# Patient Record
Sex: MALE | Race: OTHER | Employment: FULL TIME | URBAN - METROPOLITAN AREA
[De-identification: names, ages, dates, MRNs, and addresses within clinical notes are randomized per-mention and may not be internally consistent; named-entity substitution may affect disease eponyms.]

---

## 2018-07-03 ENCOUNTER — LAB ENCOUNTER (OUTPATIENT)
Dept: LAB | Facility: HOSPITAL | Age: 43
End: 2018-07-03
Attending: INTERNAL MEDICINE
Payer: COMMERCIAL

## 2018-07-03 DIAGNOSIS — Z86.39 PERSONAL HISTORY OF NUTRITIONAL DEFICIENCY: Primary | ICD-10-CM

## 2018-07-03 LAB
ALBUMIN SERPL BCP-MCNC: 4.1 G/DL (ref 3.5–4.8)
ALBUMIN/GLOB SERPL: 1.3 {RATIO} (ref 1–2)
ALP SERPL-CCNC: 43 U/L (ref 32–100)
ALT SERPL-CCNC: 30 U/L (ref 17–63)
ANION GAP SERPL CALC-SCNC: 7 MMOL/L (ref 0–18)
AST SERPL-CCNC: 29 U/L (ref 15–41)
BASOPHILS # BLD: 0.1 K/UL (ref 0–0.2)
BASOPHILS NFR BLD: 1 %
BILIRUB SERPL-MCNC: 0.7 MG/DL (ref 0.3–1.2)
BUN SERPL-MCNC: 11 MG/DL (ref 8–20)
BUN/CREAT SERPL: 9.7 (ref 10–20)
CALCIUM SERPL-MCNC: 9.4 MG/DL (ref 8.5–10.5)
CHLORIDE SERPL-SCNC: 107 MMOL/L (ref 95–110)
CHOLEST SERPL-MCNC: 194 MG/DL (ref 110–200)
CO2 SERPL-SCNC: 27 MMOL/L (ref 22–32)
CREAT SERPL-MCNC: 1.13 MG/DL (ref 0.5–1.5)
EOSINOPHIL # BLD: 0.3 K/UL (ref 0–0.7)
EOSINOPHIL NFR BLD: 4 %
ERYTHROCYTE [DISTWIDTH] IN BLOOD BY AUTOMATED COUNT: 12.3 % (ref 11–15)
GLOBULIN PLAS-MCNC: 3.2 G/DL (ref 2.5–3.7)
GLUCOSE SERPL-MCNC: 102 MG/DL (ref 70–99)
HBA1C MFR BLD: 5.4 % (ref 4–6)
HCT VFR BLD AUTO: 42.9 % (ref 41–52)
HDLC SERPL-MCNC: 42 MG/DL
HGB BLD-MCNC: 14.5 G/DL (ref 13.5–17.5)
LDLC SERPL CALC-MCNC: 137 MG/DL (ref 0–99)
LYMPHOCYTES # BLD: 1.9 K/UL (ref 1–4)
LYMPHOCYTES NFR BLD: 27 %
MCH RBC QN AUTO: 30.5 PG (ref 27–32)
MCHC RBC AUTO-ENTMCNC: 33.9 G/DL (ref 32–37)
MCV RBC AUTO: 90.1 FL (ref 80–100)
MONOCYTES # BLD: 0.5 K/UL (ref 0–1)
MONOCYTES NFR BLD: 7 %
NEUTROPHILS # BLD AUTO: 4.2 K/UL (ref 1.8–7.7)
NEUTROPHILS NFR BLD: 61 %
NONHDLC SERPL-MCNC: 152 MG/DL
OSMOLALITY UR CALC.SUM OF ELEC: 292 MOSM/KG (ref 275–295)
PATIENT FASTING: YES
PLATELET # BLD AUTO: 231 K/UL (ref 140–400)
PMV BLD AUTO: 9.6 FL (ref 7.4–10.3)
POTASSIUM SERPL-SCNC: 4.3 MMOL/L (ref 3.3–5.1)
PROT SERPL-MCNC: 7.3 G/DL (ref 5.9–8.4)
RBC # BLD AUTO: 4.76 M/UL (ref 4.5–5.9)
SODIUM SERPL-SCNC: 141 MMOL/L (ref 136–144)
TRIGL SERPL-MCNC: 75 MG/DL (ref 1–149)
TSH SERPL-ACNC: 2.59 UIU/ML (ref 0.45–5.33)
WBC # BLD AUTO: 6.9 K/UL (ref 4–11)

## 2018-07-03 PROCEDURE — 84443 ASSAY THYROID STIM HORMONE: CPT

## 2018-07-03 PROCEDURE — 36415 COLL VENOUS BLD VENIPUNCTURE: CPT

## 2018-07-03 PROCEDURE — 80061 LIPID PANEL: CPT

## 2018-07-03 PROCEDURE — 85025 COMPLETE CBC W/AUTO DIFF WBC: CPT

## 2018-07-03 PROCEDURE — 80053 COMPREHEN METABOLIC PANEL: CPT

## 2018-07-03 PROCEDURE — 83036 HEMOGLOBIN GLYCOSYLATED A1C: CPT

## 2019-03-30 ENCOUNTER — HOSPITAL ENCOUNTER (OUTPATIENT)
Facility: HOSPITAL | Age: 44
Setting detail: OBSERVATION
Discharge: HOME | End: 2019-04-01
Attending: EMERGENCY MEDICINE | Admitting: INTERNAL MEDICINE
Payer: COMMERCIAL

## 2019-03-30 DIAGNOSIS — M54.59 INTRACTABLE LOW BACK PAIN: Primary | ICD-10-CM

## 2019-03-30 RX ORDER — SODIUM CHLORIDE 0.9 % (FLUSH) 0.9 %
3 SYRINGE (ML) INJECTION AS NEEDED
Status: DISCONTINUED | OUTPATIENT
Start: 2019-03-30 | End: 2019-04-01

## 2019-03-30 RX ORDER — HYDROCODONE BITARTRATE AND ACETAMINOPHEN 5; 325 MG/1; MG/1
1 TABLET ORAL EVERY 4 HOURS PRN
Status: DISCONTINUED | OUTPATIENT
Start: 2019-03-30 | End: 2019-04-01

## 2019-03-30 RX ORDER — DOCUSATE SODIUM 100 MG/1
100 CAPSULE, LIQUID FILLED ORAL 2 TIMES DAILY
Status: DISCONTINUED | OUTPATIENT
Start: 2019-03-30 | End: 2019-04-01

## 2019-03-30 RX ORDER — CYCLOBENZAPRINE HCL 5 MG
5 TABLET ORAL 3 TIMES DAILY
COMMUNITY
End: 2020-02-21 | Stop reason: ALTCHOICE

## 2019-03-30 RX ORDER — KETOROLAC TROMETHAMINE 30 MG/ML
30 INJECTION, SOLUTION INTRAMUSCULAR; INTRAVENOUS ONCE
Status: COMPLETED | OUTPATIENT
Start: 2019-03-30 | End: 2019-03-30

## 2019-03-30 RX ORDER — BACLOFEN 10 MG/1
10 TABLET ORAL 4 TIMES DAILY
Status: DISCONTINUED | OUTPATIENT
Start: 2019-03-30 | End: 2019-04-01

## 2019-03-30 RX ORDER — METOCLOPRAMIDE HYDROCHLORIDE 5 MG/ML
10 INJECTION INTRAMUSCULAR; INTRAVENOUS EVERY 8 HOURS PRN
Status: DISCONTINUED | OUTPATIENT
Start: 2019-03-30 | End: 2019-04-01

## 2019-03-30 RX ORDER — POLYETHYLENE GLYCOL 3350 17 G/17G
17 POWDER, FOR SOLUTION ORAL DAILY PRN
Status: DISCONTINUED | OUTPATIENT
Start: 2019-03-30 | End: 2019-03-31

## 2019-03-30 RX ORDER — HYDROCODONE BITARTRATE AND ACETAMINOPHEN 5; 325 MG/1; MG/1
2 TABLET ORAL EVERY 4 HOURS PRN
Status: DISCONTINUED | OUTPATIENT
Start: 2019-03-30 | End: 2019-04-01

## 2019-03-30 RX ORDER — SODIUM PHOSPHATE, DIBASIC AND SODIUM PHOSPHATE, MONOBASIC 7; 19 G/133ML; G/133ML
1 ENEMA RECTAL ONCE AS NEEDED
Status: DISCONTINUED | OUTPATIENT
Start: 2019-03-30 | End: 2019-03-31

## 2019-03-30 RX ORDER — ACETAMINOPHEN 325 MG/1
650 TABLET ORAL EVERY 4 HOURS PRN
Status: DISCONTINUED | OUTPATIENT
Start: 2019-03-30 | End: 2019-04-01

## 2019-03-30 RX ORDER — AMOXICILLIN 500 MG
CAPSULE ORAL
COMMUNITY

## 2019-03-30 RX ORDER — ORPHENADRINE CITRATE 30 MG/ML
60 INJECTION INTRAMUSCULAR; INTRAVENOUS ONCE
Status: COMPLETED | OUTPATIENT
Start: 2019-03-30 | End: 2019-03-30

## 2019-03-30 RX ORDER — MORPHINE SULFATE 4 MG/ML
4 INJECTION, SOLUTION INTRAMUSCULAR; INTRAVENOUS ONCE
Status: DISCONTINUED | OUTPATIENT
Start: 2019-03-30 | End: 2019-03-30

## 2019-03-30 RX ORDER — BISACODYL 10 MG
10 SUPPOSITORY, RECTAL RECTAL
Status: DISCONTINUED | OUTPATIENT
Start: 2019-03-30 | End: 2019-04-01

## 2019-03-30 RX ORDER — ONDANSETRON 2 MG/ML
4 INJECTION INTRAMUSCULAR; INTRAVENOUS EVERY 6 HOURS PRN
Status: DISCONTINUED | OUTPATIENT
Start: 2019-03-30 | End: 2019-04-01

## 2019-03-30 RX ORDER — MORPHINE SULFATE 4 MG/ML
8 INJECTION, SOLUTION INTRAMUSCULAR; INTRAVENOUS ONCE
Status: COMPLETED | OUTPATIENT
Start: 2019-03-30 | End: 2019-03-30

## 2019-03-30 RX ORDER — ONDANSETRON 2 MG/ML
4 INJECTION INTRAMUSCULAR; INTRAVENOUS EVERY 4 HOURS PRN
Status: DISCONTINUED | OUTPATIENT
Start: 2019-03-30 | End: 2019-03-31 | Stop reason: ALTCHOICE

## 2019-03-30 NOTE — ED INITIAL ASSESSMENT (HPI)
Pt here via EMS for chronic back pain exacerbated this morning getting out of his car. Pt has recently been receiving physical therapy for a previous back injury.  Pt is in no distress

## 2019-03-31 ENCOUNTER — APPOINTMENT (OUTPATIENT)
Dept: MRI IMAGING | Facility: HOSPITAL | Age: 44
End: 2019-03-31
Attending: INTERNAL MEDICINE
Payer: COMMERCIAL

## 2019-03-31 DIAGNOSIS — M54.59 INTRACTABLE LOW BACK PAIN: ICD-10-CM

## 2019-03-31 DIAGNOSIS — L40.50 PSORIATIC ARTHRITIS (HCC): ICD-10-CM

## 2019-03-31 DIAGNOSIS — M54.16 LUMBAR RADICULOPATHY: Primary | ICD-10-CM

## 2019-03-31 DIAGNOSIS — M51.26 LUMBAR HERNIATED DISC: ICD-10-CM

## 2019-03-31 PROCEDURE — 72148 MRI LUMBAR SPINE W/O DYE: CPT | Performed by: INTERNAL MEDICINE

## 2019-03-31 PROCEDURE — 99244 OFF/OP CNSLTJ NEW/EST MOD 40: CPT | Performed by: PHYSICAL MEDICINE & REHABILITATION

## 2019-03-31 PROCEDURE — 72195 MRI PELVIS W/O DYE: CPT | Performed by: INTERNAL MEDICINE

## 2019-03-31 RX ORDER — MORPHINE SULFATE 2 MG/ML
2 INJECTION, SOLUTION INTRAMUSCULAR; INTRAVENOUS EVERY 2 HOUR PRN
Status: DISCONTINUED | OUTPATIENT
Start: 2019-03-31 | End: 2019-04-01

## 2019-03-31 RX ORDER — METHYLPREDNISOLONE 4 MG/1
4 TABLET ORAL 2 TIMES DAILY
Status: DISCONTINUED | OUTPATIENT
Start: 2019-04-04 | End: 2019-04-01

## 2019-03-31 RX ORDER — TROLAMINE SALICYLATE 10 G/100G
1 CREAM TOPICAL 3 TIMES DAILY
Status: DISCONTINUED | OUTPATIENT
Start: 2019-03-31 | End: 2019-04-01

## 2019-03-31 RX ORDER — METHYLPREDNISOLONE 4 MG/1
8 TABLET ORAL
Status: DISCONTINUED | OUTPATIENT
Start: 2019-04-01 | End: 2019-04-01

## 2019-03-31 RX ORDER — ASPIRIN 81 MG/1
81 TABLET ORAL DAILY
Status: DISCONTINUED | OUTPATIENT
Start: 2019-03-31 | End: 2019-04-01

## 2019-03-31 RX ORDER — METHYLPREDNISOLONE 4 MG/1
4 TABLET ORAL
Status: DISCONTINUED | OUTPATIENT
Start: 2019-04-03 | End: 2019-04-01

## 2019-03-31 RX ORDER — IBUPROFEN 400 MG/1
400 TABLET ORAL 4 TIMES DAILY
Status: DISCONTINUED | OUTPATIENT
Start: 2019-03-31 | End: 2019-04-01

## 2019-03-31 RX ORDER — MORPHINE SULFATE 4 MG/ML
4 INJECTION, SOLUTION INTRAMUSCULAR; INTRAVENOUS EVERY 2 HOUR PRN
Status: DISCONTINUED | OUTPATIENT
Start: 2019-03-31 | End: 2019-04-01

## 2019-03-31 RX ORDER — KETOROLAC TROMETHAMINE 15 MG/ML
15 INJECTION, SOLUTION INTRAMUSCULAR; INTRAVENOUS EVERY 6 HOURS PRN
Status: DISCONTINUED | OUTPATIENT
Start: 2019-03-31 | End: 2019-03-31

## 2019-03-31 RX ORDER — METHYLPREDNISOLONE 4 MG/1
4 TABLET ORAL
Status: DISCONTINUED | OUTPATIENT
Start: 2019-04-01 | End: 2019-04-01

## 2019-03-31 RX ORDER — ENOXAPARIN SODIUM 100 MG/ML
40 INJECTION SUBCUTANEOUS DAILY
Status: DISCONTINUED | OUTPATIENT
Start: 2019-03-31 | End: 2019-04-01

## 2019-03-31 RX ORDER — METHYLPREDNISOLONE 4 MG/1
8 TABLET ORAL
Status: COMPLETED | OUTPATIENT
Start: 2019-03-31 | End: 2019-03-31

## 2019-03-31 RX ORDER — BISACODYL 10 MG
10 SUPPOSITORY, RECTAL RECTAL
Status: DISCONTINUED | OUTPATIENT
Start: 2019-03-31 | End: 2019-03-31

## 2019-03-31 RX ORDER — METHYLPREDNISOLONE 4 MG/1
4 TABLET ORAL
Status: DISCONTINUED | OUTPATIENT
Start: 2019-04-02 | End: 2019-04-01

## 2019-03-31 RX ORDER — POLYETHYLENE GLYCOL 3350 17 G/17G
17 POWDER, FOR SOLUTION ORAL DAILY PRN
Status: DISCONTINUED | OUTPATIENT
Start: 2019-03-31 | End: 2019-04-01

## 2019-03-31 RX ORDER — SODIUM PHOSPHATE, DIBASIC AND SODIUM PHOSPHATE, MONOBASIC 7; 19 G/133ML; G/133ML
1 ENEMA RECTAL ONCE AS NEEDED
Status: DISCONTINUED | OUTPATIENT
Start: 2019-03-31 | End: 2019-04-01

## 2019-03-31 RX ORDER — TROLAMINE SALICYLATE 10 G/100G
1 CREAM TOPICAL 3 TIMES DAILY PRN
Status: DISCONTINUED | OUTPATIENT
Start: 2019-03-31 | End: 2019-03-31

## 2019-03-31 RX ORDER — METHYLPREDNISOLONE 4 MG/1
4 TABLET ORAL
Status: DISCONTINUED | OUTPATIENT
Start: 2019-04-05 | End: 2019-04-01

## 2019-03-31 RX ORDER — MORPHINE SULFATE 2 MG/ML
1 INJECTION, SOLUTION INTRAMUSCULAR; INTRAVENOUS EVERY 2 HOUR PRN
Status: DISCONTINUED | OUTPATIENT
Start: 2019-03-31 | End: 2019-04-01

## 2019-03-31 RX ORDER — METHYLPREDNISOLONE 4 MG/1
4 TABLET ORAL
Status: COMPLETED | OUTPATIENT
Start: 2019-03-31 | End: 2019-03-31

## 2019-03-31 NOTE — ED PROVIDER NOTES
Patient Seen in: [facility] AND [facility] Emergency Department    History   Patient presents with:  Back Pain (musculoskeletal)      HPI    Patient presents to the ED complaining of right lower back pain that he injured 3 weeks ago while in a spinning class.   H Admission:  Cyclobenzaprine HCl 5 MG Oral Tab Take 5 mg by mouth 3 (three) times daily. Disp:  Rfl:  3/30/2019 at Unknown time   aspirin 81 MG Oral Tab Take 81 mg by mouth daily.  Disp:  Rfl:  3/30/2019 at 0900   Cholecalciferol 5000 units Oral Tab Take 1 t 1820 67   Resp 03/30/19 1820 17   Temp 03/30/19 1822 98.4 °F (36.9 °C)   Temp src 03/30/19 1822 Temporal   SpO2 03/30/19 1820 100 %   O2 Device 03/30/19 1934 None (Room air)       Physical Exam   Constitutional: He is oriented to person, place, and time.  H the individual orders.    BASIC METABOLIC PANEL (8)   CBC WITH DIFFERENTIAL WITH PLATELET         Imaging Results Available and Reviewed while in ED:     ED Medications Administered:   Medications   ondansetron HCl (ZOFRAN) injection 4 mg (not administered) and procedures and reviewed those reports. Complicating Factors: The patient already has has Psoriasis; Psoriatic arthritis ([address] and Intractable low back pain on their problem list. to contribute to the complexity of this ED evaluation.     ED Course:

## 2019-03-31 NOTE — CM/SW NOTE
PMR consulted for inability to walk due to back pain. Spoke with patient for assessment. He lives at home w/ his wife Pham Garcia & their 5 y/o daughter. Patient reports that he is completely independent w/ ADLs/IADLs & drives.  He works full time, wife does not

## 2019-03-31 NOTE — H&P
[facility] Hospitalist H&P     CC: Patient presents with:  Back Pain (musculoskeletal)       PCP: Chase Macdonald MD    Admission Date: 3/30/2019    ASSESSMENT / PLAN:   Mr. Laverne Gutierrez is a 37year old male with PMH of HLD, possible psoriatic arthritis who present debilitated. Patient was lying on the floor for hours before EMS was called. Patient has a hx of possible psoriatic arthritis with swelling and joint pain of hands and feet in the past which responded to medrol dose pack. No hx of prior back pain.  Chandni Hx  Family History   Problem Relation Age of Onset   • Heart Disorder Father 62        MI   • Other (Other[other]) Father 36        Chronic vertigo   • Cancer Father 70        Lymphoma   • Diabetes Father    • Obesity Father    • Obesity Mother    • Diabet

## 2019-03-31 NOTE — ED NOTES
Orders for admission, patient is aware of plan and ready to go upstairs.  Any questions, please call ED RN Ernesto Prieto  at extension [phone]

## 2019-03-31 NOTE — PLAN OF CARE
Problem: Patient/Family Goals  Goal: Patient/Family Long Term Goal  Patient's Long Term Goal: return home to my daily activity    Interventions:    - pain management     - See additional Care Plan goals for specific interventions   Outcome: Progressing resources  INTERVENTIONS:  - Identify barriers to discharge w/pt and caregiver  - Include patient/family/discharge partner in discharge planning  - Arrange for needed discharge resources and transportation as appropriate  - Identify discharge learning need

## 2019-03-31 NOTE — PHYSICAL THERAPY NOTE
PHYSICAL THERAPY EVALUATION - INPATIENT     Room Number: 521/521-A  Evaluation Date: 3/31/2019  Type of Evaluation: Initial   Physician Order: PT Eval and Treat    Presenting Problem: Low back pain possible psoriatic arthritis flare up  Reason for Therapy symptoms. Plan to check on patient tomorrow and ensure he is ready to go home.  Patient does have the physical skills to return to prior living environment upon D/C from the hospital. Anticipate patient will benefit from continued skilled physical therapy w ASSESSMENT  Rating: (did not rate pain but better with ambulation)  Location: low back  Management Techniques: Activity promotion; Body mechanics;Repositioning    COGNITION  · Overall Cognitive Status:  WFL - within functional limits    RANGE OF MOTION AND go home   Goal #1 Patient is able to demonstrate supine - sit EOB @ level: independent     Goal #1   Current Status    Goal #2 Patient is able to demonstrate transfers Sit to/from Stand at assistance level: independent with none     Goal #2  Current Status

## 2019-04-01 VITALS
HEART RATE: 57 BPM | SYSTOLIC BLOOD PRESSURE: 128 MMHG | OXYGEN SATURATION: 95 % | DIASTOLIC BLOOD PRESSURE: 79 MMHG | BODY MASS INDEX: 27.18 KG/M2 | HEIGHT: 70 IN | TEMPERATURE: 98 F | RESPIRATION RATE: 16 BRPM | WEIGHT: 189.88 LBS

## 2019-04-01 RX ORDER — IBUPROFEN 400 MG/1
400 TABLET ORAL 3 TIMES DAILY
Qty: 60 TABLET | Refills: 0 | Status: SHIPPED | COMMUNITY
Start: 2019-04-01 | End: 2020-02-21 | Stop reason: ALTCHOICE

## 2019-04-01 RX ORDER — METHYLPREDNISOLONE 8 MG/1
8 TABLET ORAL
Qty: 1 TABLET | Refills: 0 | Status: SHIPPED | OUTPATIENT
Start: 2019-04-01 | End: 2019-04-04

## 2019-04-01 RX ORDER — METHYLPREDNISOLONE 4 MG/1
4 TABLET ORAL
Qty: 1 TABLET | Refills: 0 | Status: SHIPPED | OUTPATIENT
Start: 2019-04-05 | End: 2020-02-21 | Stop reason: ALTCHOICE

## 2019-04-01 RX ORDER — METHYLPREDNISOLONE 4 MG/1
4 TABLET ORAL 2 TIMES DAILY
Qty: 2 TABLET | Refills: 0 | Status: SHIPPED | OUTPATIENT
Start: 2019-04-04 | End: 2020-02-21 | Stop reason: ALTCHOICE

## 2019-04-01 RX ORDER — BACLOFEN 10 MG/1
10 TABLET ORAL 3 TIMES DAILY
Qty: 30 TABLET | Refills: 0 | Status: SHIPPED | OUTPATIENT
Start: 2019-04-01 | End: 2020-02-21 | Stop reason: ALTCHOICE

## 2019-04-01 RX ORDER — METHYLPREDNISOLONE 4 MG/1
4 TABLET ORAL
Qty: 1 TABLET | Refills: 0 | Status: SHIPPED | OUTPATIENT
Start: 2019-04-03 | End: 2020-02-21 | Stop reason: ALTCHOICE

## 2019-04-01 RX ORDER — METHYLPREDNISOLONE 4 MG/1
4 TABLET ORAL
Qty: 3 TABLET | Refills: 0 | Status: SHIPPED | OUTPATIENT
Start: 2019-04-01 | End: 2019-04-04

## 2019-04-01 RX ORDER — METHYLPREDNISOLONE 4 MG/1
4 TABLET ORAL
Qty: 4 TABLET | Refills: 0 | Status: SHIPPED | OUTPATIENT
Start: 2019-04-02 | End: 2020-02-21 | Stop reason: ALTCHOICE

## 2019-04-01 NOTE — PLAN OF CARE
Problem: Patient Centered Care  Goal: Patient preferences are identified and integrated in the patient's plan of care  Interventions:  - What would you like us to know as we care for you?   - Provide timely, complete, and accurate information to patient/fa FALL  Goal: Free from fall injury  INTERVENTIONS:  - Assess pt frequently for physical needs  - Identify cognitive and physical deficits and behaviors that affect risk of falls.   - [place] fall precautions as indicated by assessment.  - Educate pt/family activity level and limitations with patient/family  Outcome: Progressing      Comments: Patient with vital signs stable. On po steroids and other scheduled meds per MDO. Monitoring.

## 2019-04-01 NOTE — CONSULTS
[address] [address] Patient Status:  Observation    1975 MRN [id number]   Location [facility] Attending Brenda Mckeon MD   Hosp Day # 0 PCP Tamiko Portillo MD     Patient Identification  Billie Soni is a 37 y take 4 tablets total as prescribed Forth day take 3 tablets total as prescribed Fifth day take 2 tablets total as prescribed Sixth day take 1 tablet as prescribed Disp:  Rfl:    Cyclobenzaprine HCl 5 MG Oral Tab Take 5 mg by mouth 3 (three) times daily.  Shraddha Teran Non-medical: Not on file    Tobacco Use      Smoking status: Never Smoker      Smokeless tobacco: Never Used    Substance and Sexual Activity      Alcohol use:  Yes        Alcohol/week: 0.0 oz        Comment: rare      Drug use: No      Sexual activity: Yes clear, EOM's intact        Ears:    Normal external ear canals, both ears   Nose:   Nares normal, septum midline, mucosa normal, no drainage   Throat:   Lips, mucosa, and tongue normal; teeth and gums normal   Neck:   Supple, symmetrical, trachea midline sister who is a physician.       Time spent on counseling/coordination of care: 1 Hour

## 2019-04-01 NOTE — PLAN OF CARE
Problem: Patient Centered Care  Goal: Patient preferences are identified and integrated in the patient's plan of care  Interventions:  - What would you like us to know as we care for you?   - Provide timely, complete, and accurate information to patient/fa Assess pt frequently for physical needs  - Identify cognitive and physical deficits and behaviors that affect risk of falls.   - [place] fall precautions as indicated by assessment.  - Educate pt/family on patient safety including physical limitations  - limitations with patient/family  Outcome: Completed Date Met: 04/01/19

## 2019-04-01 NOTE — PHYSICAL THERAPY NOTE
PHYSICAL THERAPY TREATMENT NOTE - INPATIENT     Room Number: 521/521-A       Presenting Problem: Low back pain possible psoriatic arthritis flare up    Problem List  Principal Problem:    Intractable low back pain  Active Problems:    L5-S1 right small par RESTRICTION  Weight Bearing Restriction: None                PAIN ASSESSMENT   Rating: Unable to rate  Location: discomfort in low back, less than yesterday  Management Techniques: Activity promotion; Body mechanics;Repositioning    BALANCE independent with none      Goal #2  Current Status Goal met    Goal #3 Patient is able to ambulate 200 feet with assist device: none at assistance level: independent   Goal #3   Current Status Goal met    Goal #4 Patient will negotiate 12 stairs/one curb w

## 2019-04-01 NOTE — PLAN OF CARE
Problem: Patient Centered Care  Goal: Patient preferences are identified and integrated in the patient's plan of care  Interventions:  - What would you like us to know as we care for you?   - Provide timely, complete, and accurate information to patient/fa physical needs  - Identify cognitive and physical deficits and behaviors that affect risk of falls.   - [place] fall precautions as indicated by assessment.  - Educate pt/family on patient safety including physical limitations  - Instruct pt to call for a patient/family  Outcome: Adequate for Discharge      Comments: Pt states he feels much better. Does not need any pain medication this am.  Ambulating well, PT saw him again this am and cleared him to go home.

## 2019-04-02 NOTE — DISCHARGE SUMMARY
[facility] Hospitalist Discharge Summary   Patient ID:  Leatha Max [id number]  82 year old 7/4/1975    Admit date: 3/30/2019  Discharge date: 4/1/2019  Risk of Readmission Lace+ Score: 25  59-90 High Risk  29-58 Medium Risk  0-28   Low Risk    Primary Care Mr. Marcelle Sauceda is a 37year old male with PMH of HLD, possible psoriatic arthritis who presented to the ED with severe lower back pain.  Initial back was noted 3 weeks PTA and acutely worsened the day of arrival. During hospitalization MRI was obtained showin Medication List      START taking these medications    baclofen 10 MG Tabs  Commonly known as:  LIORESAL  Take 1 tablet (10 mg total) by mouth 3 (three) times daily.      ibuprofen 400 MG Tabs  Commonly known as:  MOTRIN  Take 1 tablet (400 mg total) by radha STOP taking these medications    methylPREDNISolone 4mg  Replaced by:  methylPREDNISolone 4 MG Tabs     methylPREDNISolone 4mg  Replaced by:  methylPREDNISolone 4 MG Tabs     predniSONE 20 MG Tabs  Commonly known as:  María Krause           Where to Get Your

## 2019-04-03 ENCOUNTER — PATIENT MESSAGE (OUTPATIENT)
Dept: NEUROLOGY | Facility: CLINIC | Age: 44
End: 2019-04-03

## 2019-04-03 NOTE — TELEPHONE ENCOUNTER
From: Meena Alaniz  To: Trinidad Clay MD  Sent: 4/3/2019 10:45 AM CDT  Subject: Other    Dr Leonard Nurse - I have asked [employer] to put my membership on hold for the summer due to my back.  They have requested a note from my doctor verifying my herniated

## 2019-04-04 ENCOUNTER — OFFICE VISIT (OUTPATIENT)
Dept: NEUROLOGY | Facility: CLINIC | Age: 44
End: 2019-04-04
Payer: COMMERCIAL

## 2019-04-04 ENCOUNTER — PATIENT MESSAGE (OUTPATIENT)
Dept: NEUROLOGY | Facility: CLINIC | Age: 44
End: 2019-04-04

## 2019-04-04 VITALS
SYSTOLIC BLOOD PRESSURE: 112 MMHG | RESPIRATION RATE: 17 BRPM | HEIGHT: 70 IN | WEIGHT: 189 LBS | DIASTOLIC BLOOD PRESSURE: 68 MMHG | HEART RATE: 67 BPM | BODY MASS INDEX: 27.06 KG/M2

## 2019-04-04 DIAGNOSIS — S39.012A STRAIN OF LUMBAR REGION, INITIAL ENCOUNTER: ICD-10-CM

## 2019-04-04 DIAGNOSIS — M51.26 LUMBAR HERNIATED DISC: ICD-10-CM

## 2019-04-04 DIAGNOSIS — M62.830 LUMBAR PARASPINAL MUSCLE SPASM: ICD-10-CM

## 2019-04-04 DIAGNOSIS — M51.26 DEGENERATION OF LUMBAR INTERVERTEBRAL DISC WITH ACUTE HERNIATION: ICD-10-CM

## 2019-04-04 DIAGNOSIS — M51.36 DEGENERATION OF LUMBAR INTERVERTEBRAL DISC WITH ACUTE HERNIATION: ICD-10-CM

## 2019-04-04 DIAGNOSIS — M54.50 ACUTE BILATERAL LOW BACK PAIN WITHOUT SCIATICA: Primary | ICD-10-CM

## 2019-04-04 PROCEDURE — 99215 OFFICE O/P EST HI 40 MIN: CPT | Performed by: PHYSICAL MEDICINE & REHABILITATION

## 2019-04-04 RX ORDER — METHYLPREDNISOLONE 4 MG/1
TABLET ORAL
Qty: 1 PACKAGE | Refills: 0 | Status: SHIPPED | OUTPATIENT
Start: 2019-04-04 | End: 2020-02-21 | Stop reason: ALTCHOICE

## 2019-04-04 NOTE — H&P
[address] H&P    Requesting Physician: Shelly Rosales MD    Chief Complaint (Reason for Visit):  Patient presents with:  Low Back Pain: ER follow up Pt had back spasms and pain.  Pt states that his b Father    • Obesity Mother    • Diabetes Mother    • Hypertension Mother        SOCIAL HISTORY:   Social History    Occupational History      Occupation: [employer]    Tobacco Use      Smoking status: Never Smoker      Smokeless tobacco: Never U SYSTEMS:   Review of Systems   Constitutional: Negative. HENT: Negative. Eyes: Negative. Respiratory: Negative. Cardiovascular: Negative. Gastrointestinal: Negative. Genitourinary: Negative.     Musculoskeletal: As per HPI   Skin: Negative radicular pain symptoms. Tight hamstrings noted bilaterally  Slump test: negative for pain symptoms for radicular pain symptoms.          Gait:  Normal    Data  Admission on 03/30/2019, Discharged on 04/01/2019   Component Date Value Ref Range Status   • G 86.6  % Final   • Lymphocyte % 03/30/2019 11.0  % Final   • Monocyte % 03/30/2019 1.8  % Final   • Eosinophil % 03/30/2019 0.0  % Final   • Basophil % 03/30/2019 0.2  % Final   • Immature Granulocyte % 03/30/2019 0.4  % Final   • Glucose 03/31/2019 105* 70 % 03/31/2019 19.8  % Final   • Monocyte % 03/31/2019 6.3  % Final   • Eosinophil % 03/31/2019 0.3  % Final   • Basophil % 03/31/2019 0.3  % Final   • Immature Granulocyte % 03/31/2019 0.2  % Final   • Sed Rate 03/31/2019 10  0 - 15 mm/Hr Final   • C-Reacti L5-S1  CORD/CAUDA EQUINA: Normal caliber, contour, and signal intensity. OTHER: Negative. LUMBAR DISC LEVELS:  Normal disc size signal in appearance L1-2 through L4-5  Degenerative desiccation on T2 images at L5-S1 with slight loss of disc height. the patient he does have a L5-S1 paracentral disc herniation more towards the right side. I would like for him to begin a formal course of physical therapy.   I have given him information on multiple locations downtown as well as in [facility].  I have also

## 2019-04-04 NOTE — PATIENT INSTRUCTIONS
1) Start Physical therapy.  You can go to Anjana Souza at [facility], Irlanda Demarco at [facility], or Ori REAVES at [facility] ([facility] or [facility])  2) Start the medrol dose pack as we discussed

## 2019-04-05 NOTE — TELEPHONE ENCOUNTER
From: Jarrod Alonzo  To: Timothy Hurtado MD  Sent: 4/4/2019 8:24 PM CDT  Subject: Other    Hi Dr Genevieve Sidhu - Enjoyed meeting you today. I'm looking forward to working with you to fix my back.  I was wondering if you would be able to send a quick email to my gym

## 2019-04-08 NOTE — PROGRESS NOTES
Called Pt and informed them of the Doctors recommendations. Pt understood and will look for the letter to be placed in [address]

## 2019-04-08 NOTE — TELEPHONE ENCOUNTER
Please let Valentin James know we can write him a letter and he must bring it to the health club. We can put the letter into [employer] or he can pick it up. Thanks    Salas Nazario.  Timoteo Adam MD, [address] [place] [address]  Physical Medicine and Rehabilitation/Sports Medicine  [place]

## 2019-05-30 ENCOUNTER — OFFICE VISIT (OUTPATIENT)
Dept: NEUROLOGY | Facility: CLINIC | Age: 44
End: 2019-05-30
Payer: COMMERCIAL

## 2019-05-30 VITALS
DIASTOLIC BLOOD PRESSURE: 78 MMHG | HEIGHT: 70 IN | SYSTOLIC BLOOD PRESSURE: 118 MMHG | RESPIRATION RATE: 18 BRPM | HEART RATE: 68 BPM | WEIGHT: 189 LBS | BODY MASS INDEX: 27.06 KG/M2

## 2019-05-30 DIAGNOSIS — M51.26 DEGENERATION OF LUMBAR INTERVERTEBRAL DISC WITH ACUTE HERNIATION: ICD-10-CM

## 2019-05-30 DIAGNOSIS — M62.830 LUMBAR PARASPINAL MUSCLE SPASM: ICD-10-CM

## 2019-05-30 DIAGNOSIS — S39.012A STRAIN OF LUMBAR REGION, INITIAL ENCOUNTER: ICD-10-CM

## 2019-05-30 DIAGNOSIS — M51.26 LUMBAR HERNIATED DISC: Primary | ICD-10-CM

## 2019-05-30 DIAGNOSIS — M47.817 FACET ARTHROPATHY, LUMBOSACRAL: ICD-10-CM

## 2019-05-30 DIAGNOSIS — M54.50 ACUTE BILATERAL LOW BACK PAIN WITHOUT SCIATICA: ICD-10-CM

## 2019-05-30 DIAGNOSIS — M51.36 DEGENERATION OF LUMBAR INTERVERTEBRAL DISC WITH ACUTE HERNIATION: ICD-10-CM

## 2019-05-30 PROCEDURE — 99215 OFFICE O/P EST HI 40 MIN: CPT | Performed by: PHYSICAL MEDICINE & REHABILITATION

## 2019-05-30 RX ORDER — DICLOFENAC SODIUM 75 MG/1
75 TABLET, DELAYED RELEASE ORAL 2 TIMES DAILY
Qty: 60 TABLET | Refills: 1 | Status: SHIPPED | OUTPATIENT
Start: 2019-05-30 | End: 2020-02-21 | Stop reason: ALTCHOICE

## 2019-05-30 NOTE — PROGRESS NOTES
[address]  Progress Note    CHIEF COMPLAINT:  Patient presents with:  Low Back Pain: LOV 04/04/19 Pt states that he started PT and did it for 5 weeks and it felt as if it was 80% better .  But he wasnt back 1997   • LASIK  2003       SOCIAL HISTORY:   Social History    Occupational History      Occupation: [employer]    Tobacco Use      Smoking status: Never Smoker      Smokeless tobacco: Never Used    Substance and Sexual Activity      Alcohol use Cyclobenzaprine HCl 5 MG Oral Tab Take 5 mg by mouth 3 (three) times daily. Disp:  Rfl:    aspirin 81 MG Oral Tab Take 81 mg by mouth daily. Disp:  Rfl:    Cholecalciferol 5000 units Oral Tab Take 1 tablet by mouth daily.  Disp:  Rfl:    Omega-3 Fatty Aci region  ROM: Full active range of motion of the lumbar spine with pain during extension as well as extension and rotation to the right over the right low back  Motor: 5/5 in all myotomes of the BILATERAL lower extremities   Sensation: Intact to light touch 7. 40  1.50 - 7.70 x10 (3) uL Final   • Neutrophil Absolute 03/30/2019 7.40  1.50 - 7.70 x10(3) uL Final   • Lymphocyte Absolute 03/30/2019 0.94* 1.00 - 4.00 x10(3) uL Final   • Monocyte Absolute 03/30/2019 0.15  0.10 - 1.00 x10(3) uL Final   • Eosinophil A Final   • Neutrophil Absolute 03/31/2019 6.65  1.50 - 7.70 x10(3) uL Final   • Lymphocyte Absolute 03/31/2019 1.80  1.00 - 4.00 x10(3) uL Final   • Monocyte Absolute 03/31/2019 0.57  0.10 - 1.00 x10(3) uL Final   • Eosinophil Absolute 03/31/2019 0.03  0.00 JOINTS(CPT=72195), 3/31/2019, 11:14. INDICATIONS: Severe lumbar back pain, hx of psoriatic arthritis     TECHNIQUE: A variety of imaging planes and parameters were utilized for visualization of suspected pathology.      FINDINGS: Slight loss of normal l spasm.  No abnormal vertebral body subluxation. Vertebral bodies are intact. No focal lesion or evidence to suggest acute or chronic fracture.            Dictated by (CST): Kelly Samuels MD on 3/31/2019 at 17:14       Approved by (CST): Kelly Samuels sciatica  Facet arthropathy, lumbosacral    Sidney Barrett MD  Physical Medicine and Rehabilitation/Sports Medicine  [facility]

## 2019-05-30 NOTE — PATIENT INSTRUCTIONS
Take Diclofenac 75 mg 1 tablet twice per day with food for the next two weeks and then as needed but no more than 2 tablets per day. Do not take with any other NSAIDS (Ibuprofen, Advil, Aleve, Naprosyn etc).  OK to take Tylenol 500 mg every 6 hours as neede

## 2019-06-12 ENCOUNTER — MED REC SCAN ONLY (OUTPATIENT)
Dept: NEUROLOGY | Facility: CLINIC | Age: 44
End: 2019-06-12

## 2019-07-11 ENCOUNTER — TELEPHONE (OUTPATIENT)
Dept: NEUROLOGY | Facility: CLINIC | Age: 44
End: 2019-07-11

## 2019-07-11 NOTE — TELEPHONE ENCOUNTER
Called [facility] for authorization of approval of RIGHT L3-L4, L4-5, L5-S1 Facet injections cpt codes 83141,00287,97914  Spoke to Guy Torres. Who notified that No prior authorization is required. Reference # [id number] .  Will Inform Nursing
Pt was called stated that he feels much better and would like to [facility] off\" on injection at present. Dr Eva Michael updated.
unsure

## 2019-10-10 ENCOUNTER — LAB ENCOUNTER (OUTPATIENT)
Dept: LAB | Facility: HOSPITAL | Age: 44
End: 2019-10-10
Attending: INTERNAL MEDICINE
Payer: COMMERCIAL

## 2019-10-10 DIAGNOSIS — Z00.00 ROUTINE GENERAL MEDICAL EXAMINATION AT A HEALTH CARE FACILITY: Primary | ICD-10-CM

## 2019-10-10 PROCEDURE — 36415 COLL VENOUS BLD VENIPUNCTURE: CPT

## 2019-10-10 PROCEDURE — 80053 COMPREHEN METABOLIC PANEL: CPT

## 2019-10-10 PROCEDURE — 80061 LIPID PANEL: CPT

## 2019-10-10 PROCEDURE — 83036 HEMOGLOBIN GLYCOSYLATED A1C: CPT

## 2019-10-10 PROCEDURE — 85025 COMPLETE CBC W/AUTO DIFF WBC: CPT

## (undated) NOTE — LETTER
19          Surinder Ramirez  :  1975      To Whom It May Concern: This patient was seen in our office on 19 . Mr. Laverne Gutierrez will not be unable to work out until 2019.     If this office may be of further assistance, please

## (undated) NOTE — LETTER
4/3/2019        Surinder Ramirez  : 1975      To Whom It May Concern:       Sunil Rolando Summers has under my care